# Patient Record
Sex: FEMALE | Race: OTHER | URBAN - METROPOLITAN AREA
[De-identification: names, ages, dates, MRNs, and addresses within clinical notes are randomized per-mention and may not be internally consistent; named-entity substitution may affect disease eponyms.]

---
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
## 2024-02-14 NOTE — PROCEDURE: PRESCRIPTION MEDICATION MANAGEMENT
Render In Strict Bullet Format?: No
Detail Level: Simple
None
Initiate Treatment: Clindamycin 1%/BPO 5% gel to face qHS\\nDoxycycline 20mg Take 2 tabs PO qDay with food
Discontinue Regimen: Clindamycin 1% lotion qAM\\nDoxycycline 100mg 1 tab PO qday with meal
Continue Regimen: Tretinoin . 1% cream to face qHS